# Patient Record
Sex: FEMALE | Race: WHITE | NOT HISPANIC OR LATINO
[De-identification: names, ages, dates, MRNs, and addresses within clinical notes are randomized per-mention and may not be internally consistent; named-entity substitution may affect disease eponyms.]

---

## 2017-01-05 PROBLEM — Z00.00 ENCOUNTER FOR PREVENTIVE HEALTH EXAMINATION: Status: ACTIVE | Noted: 2017-01-05

## 2017-01-31 ENCOUNTER — APPOINTMENT (OUTPATIENT)
Dept: CARDIOLOGY | Facility: CLINIC | Age: 57
End: 2017-01-31

## 2017-07-13 ENCOUNTER — OUTPATIENT (OUTPATIENT)
Dept: OUTPATIENT SERVICES | Facility: HOSPITAL | Age: 57
LOS: 1 days | End: 2017-07-13

## 2017-08-01 ENCOUNTER — OUTPATIENT (OUTPATIENT)
Dept: OUTPATIENT SERVICES | Facility: HOSPITAL | Age: 57
LOS: 1 days | End: 2017-08-01
Payer: MEDICARE

## 2017-08-01 PROCEDURE — 74230 X-RAY XM SWLNG FUNCJ C+: CPT | Mod: 26

## 2017-08-23 ENCOUNTER — APPOINTMENT (OUTPATIENT)
Dept: CARDIOLOGY | Facility: CLINIC | Age: 57
End: 2017-08-23
Payer: MEDICARE

## 2017-08-23 PROCEDURE — 99214 OFFICE O/P EST MOD 30 MIN: CPT

## 2017-08-24 ENCOUNTER — APPOINTMENT (OUTPATIENT)
Dept: CARDIOLOGY | Facility: CLINIC | Age: 57
End: 2017-08-24
Payer: MEDICARE

## 2017-08-24 PROCEDURE — 93306 TTE W/DOPPLER COMPLETE: CPT

## 2017-08-25 ENCOUNTER — APPOINTMENT (OUTPATIENT)
Dept: CARDIOLOGY | Facility: CLINIC | Age: 57
End: 2017-08-25

## 2017-08-30 ENCOUNTER — OUTPATIENT (OUTPATIENT)
Dept: OUTPATIENT SERVICES | Facility: HOSPITAL | Age: 57
LOS: 1 days | End: 2017-08-30

## 2017-10-05 ENCOUNTER — OUTPATIENT (OUTPATIENT)
Dept: OUTPATIENT SERVICES | Facility: HOSPITAL | Age: 57
LOS: 1 days | End: 2017-10-05

## 2018-01-26 ENCOUNTER — RECORD ABSTRACTING (OUTPATIENT)
Age: 58
End: 2018-01-26

## 2018-01-26 DIAGNOSIS — Z78.9 OTHER SPECIFIED HEALTH STATUS: ICD-10-CM

## 2018-01-26 DIAGNOSIS — Z85.841 PERSONAL HISTORY OF MALIGNANT NEOPLASM OF BRAIN: ICD-10-CM

## 2018-01-26 DIAGNOSIS — Z86.39 PERSONAL HISTORY OF OTHER ENDOCRINE, NUTRITIONAL AND METABOLIC DISEASE: ICD-10-CM

## 2018-01-26 DIAGNOSIS — Z87.898 PERSONAL HISTORY OF OTHER SPECIFIED CONDITIONS: ICD-10-CM

## 2018-01-26 DIAGNOSIS — Z85.3 PERSONAL HISTORY OF MALIGNANT NEOPLASM OF BREAST: ICD-10-CM

## 2018-01-26 DIAGNOSIS — Z87.39 PERSONAL HISTORY OF OTHER DISEASES OF THE MUSCULOSKELETAL SYSTEM AND CONNECTIVE TISSUE: ICD-10-CM

## 2018-01-26 DIAGNOSIS — Z86.69 PERSONAL HISTORY OF OTHER DISEASES OF THE NERVOUS SYSTEM AND SENSE ORGANS: ICD-10-CM

## 2018-01-26 DIAGNOSIS — Z87.09 PERSONAL HISTORY OF OTHER DISEASES OF THE RESPIRATORY SYSTEM: ICD-10-CM

## 2018-01-26 DIAGNOSIS — Z98.2 PRESENCE OF CEREBROSPINAL FLUID DRAINAGE DEVICE: ICD-10-CM

## 2018-01-26 DIAGNOSIS — Z86.79 PERSONAL HISTORY OF OTHER DISEASES OF THE CIRCULATORY SYSTEM: ICD-10-CM

## 2018-01-26 RX ORDER — TOPIRAMATE 200 MG/1
200 TABLET ORAL TWICE DAILY
Refills: 0 | Status: ACTIVE | COMMUNITY

## 2018-01-26 RX ORDER — CHOLECALCIFEROL (VITAMIN D3) 250 MCG
250 MCG CAPSULE ORAL DAILY
Refills: 0 | Status: ACTIVE | COMMUNITY

## 2018-01-26 RX ORDER — LABETALOL HYDROCHLORIDE 100 MG/1
100 TABLET, FILM COATED ORAL DAILY
Refills: 0 | Status: ACTIVE | COMMUNITY

## 2018-01-26 RX ORDER — SERTRALINE HYDROCHLORIDE 100 MG/1
100 TABLET, FILM COATED ORAL DAILY
Refills: 0 | Status: ACTIVE | COMMUNITY

## 2018-01-26 RX ORDER — DOCUSATE SODIUM 100 MG/1
100 CAPSULE, LIQUID FILLED ORAL
Refills: 0 | Status: ACTIVE | COMMUNITY

## 2018-11-29 ENCOUNTER — EMERGENCY (EMERGENCY)
Facility: HOSPITAL | Age: 58
LOS: 1 days | End: 2018-11-29
Payer: MEDICARE

## 2018-11-29 PROCEDURE — 99284 EMERGENCY DEPT VISIT MOD MDM: CPT

## 2018-11-29 PROCEDURE — 72125 CT NECK SPINE W/O DYE: CPT | Mod: 26

## 2018-11-29 PROCEDURE — 70450 CT HEAD/BRAIN W/O DYE: CPT | Mod: 26

## 2019-02-17 ENCOUNTER — EMERGENCY (EMERGENCY)
Facility: HOSPITAL | Age: 59
LOS: 1 days | End: 2019-02-17
Admitting: EMERGENCY MEDICINE
Payer: MEDICARE

## 2019-02-17 PROCEDURE — 72125 CT NECK SPINE W/O DYE: CPT | Mod: 26

## 2019-02-17 PROCEDURE — 12011 RPR F/E/E/N/L/M 2.5 CM/<: CPT

## 2019-02-17 PROCEDURE — 99284 EMERGENCY DEPT VISIT MOD MDM: CPT | Mod: 25

## 2019-02-17 PROCEDURE — 70450 CT HEAD/BRAIN W/O DYE: CPT | Mod: 26

## 2019-05-29 ENCOUNTER — OUTPATIENT (OUTPATIENT)
Dept: OUTPATIENT SERVICES | Facility: HOSPITAL | Age: 59
LOS: 1 days | End: 2019-05-29

## 2019-11-18 ENCOUNTER — APPOINTMENT (OUTPATIENT)
Dept: MAMMOGRAPHY | Facility: CLINIC | Age: 59
End: 2019-11-18

## 2019-11-21 ENCOUNTER — OUTPATIENT (OUTPATIENT)
Dept: OUTPATIENT SERVICES | Facility: HOSPITAL | Age: 59
LOS: 1 days | End: 2019-11-21
Payer: MEDICARE

## 2019-11-21 PROCEDURE — 74230 X-RAY XM SWLNG FUNCJ C+: CPT | Mod: 26

## 2019-11-27 ENCOUNTER — APPOINTMENT (OUTPATIENT)
Dept: MAMMOGRAPHY | Facility: CLINIC | Age: 59
End: 2019-11-27
Payer: MEDICARE

## 2019-11-27 PROCEDURE — 77067 SCR MAMMO BI INCL CAD: CPT

## 2019-11-27 PROCEDURE — 77063 BREAST TOMOSYNTHESIS BI: CPT

## 2020-12-15 ENCOUNTER — APPOINTMENT (OUTPATIENT)
Dept: CARDIOLOGY | Facility: CLINIC | Age: 60
End: 2020-12-15
Payer: MEDICARE

## 2020-12-15 ENCOUNTER — NON-APPOINTMENT (OUTPATIENT)
Age: 60
End: 2020-12-15

## 2020-12-15 VITALS
DIASTOLIC BLOOD PRESSURE: 70 MMHG | BODY MASS INDEX: 28.89 KG/M2 | HEART RATE: 76 BPM | OXYGEN SATURATION: 98 % | WEIGHT: 153 LBS | SYSTOLIC BLOOD PRESSURE: 122 MMHG | HEIGHT: 61 IN | TEMPERATURE: 97.8 F

## 2020-12-15 PROCEDURE — 99203 OFFICE O/P NEW LOW 30 MIN: CPT

## 2020-12-15 RX ORDER — CALCIUM CARBONATE 260MG(650)
500 TABLET,CHEWABLE ORAL DAILY
Refills: 0 | Status: ACTIVE | COMMUNITY

## 2020-12-15 RX ORDER — ALBUTEROL SULFATE 0.63 MG/3ML
0.63 SOLUTION RESPIRATORY (INHALATION)
Refills: 0 | Status: DISCONTINUED | COMMUNITY
End: 2020-12-15

## 2020-12-15 RX ORDER — RANITIDINE 150 MG/1
150 TABLET ORAL
Refills: 0 | Status: DISCONTINUED | COMMUNITY
End: 2020-12-15

## 2020-12-15 RX ORDER — PROBIOTIC PRODUCT - TAB 1B-250 MG
TAB ORAL DAILY
Refills: 0 | Status: DISCONTINUED | COMMUNITY
End: 2020-12-15

## 2020-12-15 RX ORDER — LEVETIRACETAM 1000 MG/1
1000 TABLET, FILM COATED ORAL TWICE DAILY
Refills: 0 | Status: ACTIVE | COMMUNITY

## 2020-12-15 RX ORDER — SENNOSIDES 8.6 MG
8.6 TABLET ORAL AT BEDTIME
Refills: 0 | Status: DISCONTINUED | COMMUNITY
End: 2020-12-15

## 2020-12-15 NOTE — PHYSICAL EXAM
[General Appearance - Well Developed] : well developed [Normal Appearance] : normal appearance [Well Groomed] : well groomed [General Appearance - Well Nourished] : well nourished [No Deformities] : no deformities [General Appearance - In No Acute Distress] : no acute distress [Normal Conjunctiva] : the conjunctiva exhibited no abnormalities [Eyelids - No Xanthelasma] : the eyelids demonstrated no xanthelasmas [Normal Oral Mucosa] : normal oral mucosa [No Oral Pallor] : no oral pallor [No Oral Cyanosis] : no oral cyanosis [Normal Jugular Venous A Waves Present] : normal jugular venous A waves present [Normal Jugular Venous V Waves Present] : normal jugular venous V waves present [No Jugular Venous Dahl A Waves] : no jugular venous dahl A waves [Respiration, Rhythm And Depth] : normal respiratory rhythm and effort [Exaggerated Use Of Accessory Muscles For Inspiration] : no accessory muscle use [Auscultation Breath Sounds / Voice Sounds] : lungs were clear to auscultation bilaterally [Heart Rate And Rhythm] : heart rate and rhythm were normal [Heart Sounds] : normal S1 and S2 [Murmurs] : no murmurs present [Abdomen Soft] : soft [Abdomen Tenderness] : non-tender [Abdomen Mass (___ Cm)] : no abdominal mass palpated [Abnormal Walk] : normal gait [Gait - Sufficient For Exercise Testing] : the gait was sufficient for exercise testing [Nail Clubbing] : no clubbing of the fingernails [Cyanosis, Localized] : no localized cyanosis [Petechial Hemorrhages (___cm)] : no petechial hemorrhages [Skin Color & Pigmentation] : normal skin color and pigmentation [] : no rash [No Venous Stasis] : no venous stasis [Skin Lesions] : no skin lesions [No Skin Ulcers] : no skin ulcer [No Xanthoma] : no  xanthoma was observed [Oriented To Time, Place, And Person] : oriented to person, place, and time [Affect] : the affect was normal [Mood] : the mood was normal [No Anxiety] : not feeling anxious

## 2020-12-15 NOTE — DISCUSSION/SUMMARY
[Optimized for Surgery] : the patient is optimized for surgery [FreeTextEntry1] : Need for colonoscopy:\par \par Pre Operative Issues:\par \par The patient is maximized for the planned procedure.\par \par Proceed without delay.\par \par Keep input equal to output.\par \par Standard DVT prophylaxis is recommended.\par \par No aspirin or nonsteroidal anti-inflammatory drugs for 5 days preoperatively.\par

## 2020-12-15 NOTE — ASSESSMENT
[FreeTextEntry1] : Abnormal ECG: Nonspecific changes.  Overall have elected to discontinue Plavix and start baby aspirin daily.  The Plavix was apparently added after a device was implanted.  There is no history of vascular disease.

## 2020-12-18 ENCOUNTER — NON-APPOINTMENT (OUTPATIENT)
Age: 60
End: 2020-12-18

## 2021-01-05 ENCOUNTER — APPOINTMENT (OUTPATIENT)
Dept: MAMMOGRAPHY | Facility: CLINIC | Age: 61
End: 2021-01-05
Payer: MEDICARE

## 2021-01-05 ENCOUNTER — APPOINTMENT (OUTPATIENT)
Dept: ULTRASOUND IMAGING | Facility: CLINIC | Age: 61
End: 2021-01-05

## 2021-01-05 PROCEDURE — 76641 ULTRASOUND BREAST COMPLETE: CPT | Mod: 50

## 2021-01-12 ENCOUNTER — OUTPATIENT (OUTPATIENT)
Dept: OUTPATIENT SERVICES | Facility: HOSPITAL | Age: 61
LOS: 1 days | End: 2021-01-12

## 2021-10-06 ENCOUNTER — RX RENEWAL (OUTPATIENT)
Age: 61
End: 2021-10-06

## 2021-11-08 ENCOUNTER — APPOINTMENT (OUTPATIENT)
Dept: ULTRASOUND IMAGING | Facility: CLINIC | Age: 61
End: 2021-11-08
Payer: MEDICARE

## 2021-11-08 ENCOUNTER — APPOINTMENT (OUTPATIENT)
Dept: MAMMOGRAPHY | Facility: CLINIC | Age: 61
End: 2021-11-08

## 2021-11-08 PROCEDURE — 77067 SCR MAMMO BI INCL CAD: CPT

## 2021-11-08 PROCEDURE — 77063 BREAST TOMOSYNTHESIS BI: CPT

## 2021-11-08 PROCEDURE — 76641 ULTRASOUND BREAST COMPLETE: CPT | Mod: 50

## 2022-01-26 ENCOUNTER — APPOINTMENT (OUTPATIENT)
Dept: CARDIOLOGY | Facility: CLINIC | Age: 62
End: 2022-01-26
Payer: MEDICARE

## 2022-01-26 VITALS
HEART RATE: 78 BPM | DIASTOLIC BLOOD PRESSURE: 68 MMHG | TEMPERATURE: 97 F | HEIGHT: 61 IN | OXYGEN SATURATION: 96 % | WEIGHT: 153 LBS | BODY MASS INDEX: 28.89 KG/M2 | SYSTOLIC BLOOD PRESSURE: 110 MMHG

## 2022-01-26 PROCEDURE — 93000 ELECTROCARDIOGRAM COMPLETE: CPT

## 2022-01-26 PROCEDURE — 99212 OFFICE O/P EST SF 10 MIN: CPT

## 2022-01-26 RX ORDER — CLOPIDOGREL BISULFATE 75 MG/1
75 TABLET, FILM COATED ORAL DAILY
Qty: 90 | Refills: 2 | Status: DISCONTINUED | COMMUNITY
End: 2022-01-26

## 2022-01-26 RX ORDER — LEVOTHYROXINE SODIUM 112 UG/1
112 TABLET ORAL DAILY
Refills: 0 | Status: DISCONTINUED | COMMUNITY
End: 2022-01-26

## 2022-01-26 NOTE — HISTORY OF PRESENT ILLNESS
[FreeTextEntry1] : Abnormal ECG: Nonspecific findings.  The patient is wheelchair-bound.  She is status post brain surgery and is essentially asymptomatic.  No further investigation is needed in this regard.

## 2022-01-31 ENCOUNTER — RX RENEWAL (OUTPATIENT)
Age: 62
End: 2022-01-31

## 2022-01-31 RX ORDER — ASPIRIN 81 MG/1
81 TABLET, COATED ORAL DAILY
Qty: 30 | Refills: 0 | Status: ACTIVE | COMMUNITY
Start: 2022-01-31 | End: 1900-01-01

## 2022-12-22 ENCOUNTER — APPOINTMENT (OUTPATIENT)
Dept: CT IMAGING | Facility: CLINIC | Age: 62
End: 2022-12-22

## 2022-12-22 ENCOUNTER — APPOINTMENT (OUTPATIENT)
Dept: RADIOLOGY | Facility: CLINIC | Age: 62
End: 2022-12-22

## 2023-01-20 ENCOUNTER — NON-APPOINTMENT (OUTPATIENT)
Age: 63
End: 2023-01-20

## 2023-01-20 ENCOUNTER — APPOINTMENT (OUTPATIENT)
Dept: CARDIOLOGY | Facility: CLINIC | Age: 63
End: 2023-01-20
Payer: MEDICARE

## 2023-01-20 VITALS
HEIGHT: 61 IN | OXYGEN SATURATION: 95 % | WEIGHT: 155 LBS | DIASTOLIC BLOOD PRESSURE: 62 MMHG | SYSTOLIC BLOOD PRESSURE: 104 MMHG | BODY MASS INDEX: 29.27 KG/M2 | HEART RATE: 82 BPM

## 2023-01-20 PROCEDURE — 99212 OFFICE O/P EST SF 10 MIN: CPT

## 2023-01-20 RX ORDER — ASPIRIN 81 MG/1
81 TABLET, DELAYED RELEASE ORAL DAILY
Qty: 90 | Refills: 3 | Status: DISCONTINUED | COMMUNITY
Start: 2020-12-16 | End: 2023-01-20

## 2023-01-20 NOTE — REASON FOR VISIT
[FreeTextEntry1] : Abnormal ECG: The patient is essentially not able to express herself.  She has had brain surgery.  ECG is not significantly different.  She comes because of [facility] yearly follow-up.  Unable to provide any meaningful history.  Overall feel that continued observation is her best option.

## 2023-01-20 NOTE — ASSESSMENT
[FreeTextEntry1] : Abnormal ECG: The patient is essentially not able to express herself.  She has had brain surgery.  ECG is not significantly different.  She comes because of [facility] yearly follow-up.  Unable to provide any meaningful history.  Overall feel that continued observation is her best option.

## 2023-02-06 ENCOUNTER — OFFICE (OUTPATIENT)
Dept: URBAN - METROPOLITAN AREA CLINIC 38 | Facility: CLINIC | Age: 63
Setting detail: OPHTHALMOLOGY
End: 2023-02-06
Payer: MEDICARE

## 2023-02-06 DIAGNOSIS — H50.10: ICD-10-CM

## 2023-02-06 DIAGNOSIS — H25.13: ICD-10-CM

## 2023-02-06 DIAGNOSIS — H35.013: ICD-10-CM

## 2023-02-06 DIAGNOSIS — H16.223: ICD-10-CM

## 2023-02-06 PROCEDURE — 92014 COMPRE OPH EXAM EST PT 1/>: CPT | Performed by: OPHTHALMOLOGY

## 2023-02-06 ASSESSMENT — KERATOMETRY
OS_K1POWER_DIOPTERS: 44.50
OD_AXISANGLE_DEGREES: 095
OD_K1POWER_DIOPTERS: 43.25
METHOD_AUTO_MANUAL: AUTO
OD_K2POWER_DIOPTERS: 44.75
OS_K2POWER_DIOPTERS: 46.00
OS_AXISANGLE_DEGREES: 101

## 2023-02-06 ASSESSMENT — REFRACTION_AUTOREFRACTION
OS_SPHERE: -1.00
OS_AXIS: 013
OS_CYLINDER: -1.25
OD_CYLINDER: -2.50
OD_SPHERE: PLANO
OD_AXIS: 002

## 2023-02-06 ASSESSMENT — CONFRONTATIONAL VISUAL FIELD TEST (CVF)
OS_FINDINGS: FULL
OD_FINDINGS: FULL

## 2023-02-06 ASSESSMENT — SPHEQUIV_DERIVED: OS_SPHEQUIV: -1.625

## 2023-02-06 ASSESSMENT — VISUAL ACUITY
OD_BCVA: F&F
OS_BCVA: F&F

## 2023-02-06 ASSESSMENT — AXIALLENGTH_DERIVED: OS_AL: 23.5822

## 2023-03-14 ENCOUNTER — APPOINTMENT (OUTPATIENT)
Dept: ULTRASOUND IMAGING | Facility: CLINIC | Age: 63
End: 2023-03-14
Payer: MEDICARE

## 2023-03-14 ENCOUNTER — APPOINTMENT (OUTPATIENT)
Dept: RADIOLOGY | Facility: CLINIC | Age: 63
End: 2023-03-14
Payer: MEDICARE

## 2023-03-14 ENCOUNTER — APPOINTMENT (OUTPATIENT)
Dept: MAMMOGRAPHY | Facility: CLINIC | Age: 63
End: 2023-03-14
Payer: MEDICARE

## 2023-03-14 PROCEDURE — 77063 BREAST TOMOSYNTHESIS BI: CPT

## 2023-03-14 PROCEDURE — 77067 SCR MAMMO BI INCL CAD: CPT

## 2023-03-14 PROCEDURE — 76641 ULTRASOUND BREAST COMPLETE: CPT | Mod: 50,GA

## 2023-03-14 PROCEDURE — 77080 DXA BONE DENSITY AXIAL: CPT

## 2023-03-20 ENCOUNTER — APPOINTMENT (OUTPATIENT)
Dept: ULTRASOUND IMAGING | Facility: CLINIC | Age: 63
End: 2023-03-20
Payer: MEDICARE

## 2023-03-20 PROCEDURE — 76642 ULTRASOUND BREAST LIMITED: CPT | Mod: LT

## 2023-05-03 NOTE — HISTORY OF PRESENT ILLNESS
118 [Scheduled Procedure ___] : a [unfilled] [FreeTextEntry1] : The patient is wheelchair-bound.  The patient denies any chest pain shortness of breath or palpitations.  She underwent routine ECG.  There were nonspecific ST changes.  She was referred for cardiology evaluation prior to colonoscopy.  There has been no change in her baseline status recently.  There is no history of cardiovascular disease.

## 2023-10-27 ENCOUNTER — APPOINTMENT (OUTPATIENT)
Dept: ULTRASOUND IMAGING | Facility: CLINIC | Age: 63
End: 2023-10-27
Payer: MEDICARE

## 2023-10-27 PROCEDURE — 76642 ULTRASOUND BREAST LIMITED: CPT | Mod: LT

## 2024-01-23 ENCOUNTER — APPOINTMENT (OUTPATIENT)
Dept: CARDIOLOGY | Facility: CLINIC | Age: 64
End: 2024-01-23

## 2024-02-21 ENCOUNTER — NON-APPOINTMENT (OUTPATIENT)
Age: 64
End: 2024-02-21

## 2024-02-21 ENCOUNTER — APPOINTMENT (OUTPATIENT)
Dept: CARDIOLOGY | Facility: CLINIC | Age: 64
End: 2024-02-21
Payer: MEDICARE

## 2024-02-21 DIAGNOSIS — I35.8 OTHER NONRHEUMATIC AORTIC VALVE DISORDERS: ICD-10-CM

## 2024-02-21 PROCEDURE — 99214 OFFICE O/P EST MOD 30 MIN: CPT

## 2024-02-21 RX ORDER — FAMOTIDINE 20 MG/1
20 TABLET, FILM COATED ORAL
Refills: 0 | Status: ACTIVE | COMMUNITY

## 2024-02-21 RX ORDER — ATORVASTATIN CALCIUM 40 MG/1
40 TABLET, FILM COATED ORAL DAILY
Refills: 0 | Status: ACTIVE | COMMUNITY

## 2024-02-21 RX ORDER — CLOBAZAM 10 MG/1
10 TABLET ORAL
Refills: 0 | Status: ACTIVE | COMMUNITY

## 2024-02-21 RX ORDER — DOCUSATE SODIUM 100 MG/1
100 CAPSULE ORAL
Refills: 0 | Status: ACTIVE | COMMUNITY

## 2024-02-21 NOTE — REASON FOR VISIT
[FreeTextEntry1] : The patient is seen in follow-up for abnormal ECG.  The patient is unable to express herself.  The patient has a history of a brain tumor.  There has been no significant clinical change.    Pt. lives in [facility].

## 2024-02-21 NOTE — ASSESSMENT
[FreeTextEntry1] : Review of ECG from January 2023 reveals normal sinus rhythm, slight R wave decay from V2 to V3.  ECG today NSR NSST, no significant change from Jan 2023.  Transthoracic echocardiography in January 2023 revealed ejection fraction 65% and fibrocalcific aortic valve disease without stenosis.  Aortic sclerosis: The patient is asymptomatic.  The plan will be to consider repeat echocardiography if she develops shortness of breath.  Abnormal ECG: Given the normal left ventricular function found by transthoracic echocardiography no need for further pursuit in this clinical situation.  Plan will be to consider repeat echo for any symptoms.

## 2024-03-13 ENCOUNTER — OFFICE (OUTPATIENT)
Dept: URBAN - METROPOLITAN AREA CLINIC 38 | Facility: CLINIC | Age: 64
Setting detail: OPHTHALMOLOGY
End: 2024-03-13
Payer: MEDICARE

## 2024-03-13 ENCOUNTER — RX ONLY (RX ONLY)
Age: 64
End: 2024-03-13

## 2024-03-13 DIAGNOSIS — H25.13: ICD-10-CM

## 2024-03-13 DIAGNOSIS — H50.10: ICD-10-CM

## 2024-03-13 DIAGNOSIS — H35.013: ICD-10-CM

## 2024-03-13 DIAGNOSIS — H16.223: ICD-10-CM

## 2024-03-13 PROCEDURE — 92014 COMPRE OPH EXAM EST PT 1/>: CPT | Performed by: OPHTHALMOLOGY

## 2024-04-17 ENCOUNTER — APPOINTMENT (OUTPATIENT)
Dept: ULTRASOUND IMAGING | Facility: CLINIC | Age: 64
End: 2024-04-17

## 2024-05-30 ENCOUNTER — APPOINTMENT (OUTPATIENT)
Dept: CARDIOLOGY | Facility: CLINIC | Age: 64
End: 2024-05-30
Payer: MEDICARE

## 2024-05-30 VITALS
WEIGHT: 155 LBS | OXYGEN SATURATION: 95 % | HEART RATE: 78 BPM | HEIGHT: 61 IN | DIASTOLIC BLOOD PRESSURE: 60 MMHG | SYSTOLIC BLOOD PRESSURE: 112 MMHG | BODY MASS INDEX: 29.27 KG/M2

## 2024-05-30 DIAGNOSIS — R94.31 ABNORMAL ELECTROCARDIOGRAM [ECG] [EKG]: ICD-10-CM

## 2024-05-30 PROCEDURE — 99213 OFFICE O/P EST LOW 20 MIN: CPT

## 2024-05-30 RX ORDER — LAMOTRIGINE 25 MG/1
25 TABLET ORAL
Refills: 0 | Status: ACTIVE | COMMUNITY

## 2024-05-30 RX ORDER — LAMOTRIGINE 200 MG/1
200 TABLET ORAL TWICE DAILY
Refills: 0 | Status: ACTIVE | COMMUNITY

## 2024-05-30 RX ORDER — LEVOTHYROXINE SODIUM 0.11 MG/1
112 TABLET ORAL
Refills: 0 | Status: ACTIVE | COMMUNITY

## 2024-05-30 NOTE — DISCUSSION/SUMMARY
[Optimized for Surgery] : the patient is optimized for surgery [FreeTextEntry1] : Pre Operative Issues:  The patient is maximized for the planned procedure.  Proceed without delay.  Keep input equal to output.  Standard DVT prophylaxis is recommended.

## 2024-05-30 NOTE — HISTORY OF PRESENT ILLNESS
[Scheduled Procedure ___] : a [unfilled] [FreeTextEntry1] : The patient is seen for follow-up of an abnormal ECG.  The patient is scheduled for colonoscopy.  The patient lives in [facility].  The patient is noncommunicative.  The patient denies any recent history of chest pain shortness of breath.  The caregiver does not indicate that the patient has complained of anything recently.  The patient had an ECG in the past which revealed normal sinus rhythm nonspecific ST changes.

## 2024-05-30 NOTE — ASSESSMENT
[FreeTextEntry1] : Abnormal ECG: No symptoms.  No further investigation needed.  Aortic sclerosis: No need for further investigation.

## 2024-07-02 ENCOUNTER — APPOINTMENT (OUTPATIENT)
Dept: ULTRASOUND IMAGING | Facility: CLINIC | Age: 64
End: 2024-07-02
Payer: MEDICARE

## 2024-07-02 ENCOUNTER — APPOINTMENT (OUTPATIENT)
Dept: MAMMOGRAPHY | Facility: CLINIC | Age: 64
End: 2024-07-02
Payer: MEDICARE

## 2024-07-02 PROCEDURE — 76641 ULTRASOUND BREAST COMPLETE: CPT | Mod: 50,3G

## 2024-07-02 PROCEDURE — 77067 SCR MAMMO BI INCL CAD: CPT

## 2024-07-02 PROCEDURE — 77063 BREAST TOMOSYNTHESIS BI: CPT

## 2024-11-21 ENCOUNTER — APPOINTMENT (OUTPATIENT)
Dept: CARDIOLOGY | Facility: CLINIC | Age: 64
End: 2024-11-21

## 2024-12-02 ENCOUNTER — APPOINTMENT (OUTPATIENT)
Dept: CARDIOLOGY | Facility: CLINIC | Age: 64
End: 2024-12-02
Payer: MEDICARE

## 2024-12-02 VITALS
HEIGHT: 61 IN | HEART RATE: 70 BPM | WEIGHT: 155 LBS | OXYGEN SATURATION: 97 % | DIASTOLIC BLOOD PRESSURE: 62 MMHG | SYSTOLIC BLOOD PRESSURE: 124 MMHG | BODY MASS INDEX: 29.27 KG/M2

## 2024-12-02 PROCEDURE — 99213 OFFICE O/P EST LOW 20 MIN: CPT

## 2024-12-02 RX ORDER — LEVETIRACETAM 250 MG/1
250 TABLET, FILM COATED ORAL TWICE DAILY
Refills: 0 | Status: ACTIVE | COMMUNITY

## 2024-12-02 RX ORDER — POTASSIUM CHLORIDE 750 MG/1
10 TABLET, FILM COATED, EXTENDED RELEASE ORAL DAILY
Refills: 0 | Status: ACTIVE | COMMUNITY

## 2025-03-19 ENCOUNTER — OFFICE (OUTPATIENT)
Dept: URBAN - METROPOLITAN AREA CLINIC 38 | Facility: CLINIC | Age: 65
Setting detail: OPHTHALMOLOGY
End: 2025-03-19
Payer: MEDICARE

## 2025-03-19 VITALS — HEIGHT: 55 IN

## 2025-03-19 DIAGNOSIS — H16.223: ICD-10-CM

## 2025-03-19 DIAGNOSIS — H25.13: ICD-10-CM

## 2025-03-19 DIAGNOSIS — H35.013: ICD-10-CM

## 2025-03-19 DIAGNOSIS — H50.10: ICD-10-CM

## 2025-03-19 PROCEDURE — 92014 COMPRE OPH EXAM EST PT 1/>: CPT

## 2025-03-19 ASSESSMENT — REFRACTION_AUTOREFRACTION
OD_CYLINDER: -2.50
OS_AXIS: 013
OS_SPHERE: -1.00
OS_CYLINDER: -1.25
OD_AXIS: 002
OD_SPHERE: PLANO

## 2025-03-19 ASSESSMENT — KERATOMETRY
OD_K2POWER_DIOPTERS: 44.75
METHOD_AUTO_MANUAL: AUTO
OS_K1POWER_DIOPTERS: 44.50
OD_K1POWER_DIOPTERS: 43.25
OS_AXISANGLE_DEGREES: 101
OS_K2POWER_DIOPTERS: 46.00
OD_AXISANGLE_DEGREES: 095

## 2025-03-19 ASSESSMENT — CONFRONTATIONAL VISUAL FIELD TEST (CVF)
OS_FINDINGS: FULL
OD_FINDINGS: FULL

## 2025-03-19 ASSESSMENT — VISUAL ACUITY
OD_BCVA: F&F
OS_BCVA: F&F

## 2025-05-14 ENCOUNTER — APPOINTMENT (OUTPATIENT)
Dept: RADIOLOGY | Facility: CLINIC | Age: 65
End: 2025-05-14

## 2025-05-14 ENCOUNTER — APPOINTMENT (OUTPATIENT)
Dept: ULTRASOUND IMAGING | Facility: CLINIC | Age: 65
End: 2025-05-14

## 2025-07-23 ENCOUNTER — APPOINTMENT (OUTPATIENT)
Dept: ULTRASOUND IMAGING | Facility: CLINIC | Age: 65
End: 2025-07-23

## 2025-07-23 ENCOUNTER — APPOINTMENT (OUTPATIENT)
Dept: MAMMOGRAPHY | Facility: CLINIC | Age: 65
End: 2025-07-23
Payer: MEDICARE

## 2025-07-23 PROCEDURE — 76641 ULTRASOUND BREAST COMPLETE: CPT | Mod: 50,GA

## 2025-07-23 PROCEDURE — 77066 DX MAMMO INCL CAD BI: CPT

## 2025-07-23 PROCEDURE — G0279: CPT

## 2025-08-20 ENCOUNTER — APPOINTMENT (OUTPATIENT)
Dept: ULTRASOUND IMAGING | Facility: CLINIC | Age: 65
End: 2025-08-20
Payer: MEDICARE

## 2025-08-20 PROCEDURE — 76642 ULTRASOUND BREAST LIMITED: CPT | Mod: LT

## 2025-09-17 ENCOUNTER — APPOINTMENT (OUTPATIENT)
Dept: ULTRASOUND IMAGING | Facility: CLINIC | Age: 65
End: 2025-09-17
Payer: MEDICARE

## 2025-09-17 PROCEDURE — 19083 BX BREAST 1ST LESION US IMAG: CPT | Mod: LT
